# Patient Record
Sex: MALE | Race: BLACK OR AFRICAN AMERICAN
[De-identification: names, ages, dates, MRNs, and addresses within clinical notes are randomized per-mention and may not be internally consistent; named-entity substitution may affect disease eponyms.]

---

## 2018-04-04 ENCOUNTER — HOSPITAL ENCOUNTER (INPATIENT)
Dept: HOSPITAL 35 - ER | Age: 58
LOS: 5 days | Discharge: NURSING, REHAB OR LONG-TERM CARE | DRG: 65 | End: 2018-04-09
Attending: HOSPITALIST | Admitting: HOSPITALIST
Payer: COMMERCIAL

## 2018-04-04 VITALS — DIASTOLIC BLOOD PRESSURE: 90 MMHG | SYSTOLIC BLOOD PRESSURE: 140 MMHG

## 2018-04-04 VITALS — BODY MASS INDEX: 31.7 KG/M2 | HEIGHT: 69.02 IN | WEIGHT: 214 LBS

## 2018-04-04 DIAGNOSIS — I44.7: ICD-10-CM

## 2018-04-04 DIAGNOSIS — N18.9: ICD-10-CM

## 2018-04-04 DIAGNOSIS — G81.91: ICD-10-CM

## 2018-04-04 DIAGNOSIS — E11.22: ICD-10-CM

## 2018-04-04 DIAGNOSIS — N17.9: ICD-10-CM

## 2018-04-04 DIAGNOSIS — Z23: ICD-10-CM

## 2018-04-04 DIAGNOSIS — Z79.899: ICD-10-CM

## 2018-04-04 DIAGNOSIS — Z82.49: ICD-10-CM

## 2018-04-04 DIAGNOSIS — Z82.3: ICD-10-CM

## 2018-04-04 DIAGNOSIS — I63.9: Primary | ICD-10-CM

## 2018-04-04 DIAGNOSIS — E78.00: ICD-10-CM

## 2018-04-04 DIAGNOSIS — G51.0: ICD-10-CM

## 2018-04-04 DIAGNOSIS — E78.5: ICD-10-CM

## 2018-04-04 DIAGNOSIS — I42.9: ICD-10-CM

## 2018-04-04 DIAGNOSIS — Z79.84: ICD-10-CM

## 2018-04-04 DIAGNOSIS — I12.9: ICD-10-CM

## 2018-04-04 DIAGNOSIS — F17.210: ICD-10-CM

## 2018-04-04 LAB
ANION GAP SERPL CALC-SCNC: 11 MMOL/L (ref 7–16)
BASOPHILS NFR BLD AUTO: 0.9 % (ref 0–2)
BUN SERPL-MCNC: 54 MG/DL (ref 7–18)
CALCIUM SERPL-MCNC: 9 MG/DL (ref 8.5–10.1)
CHLORIDE SERPL-SCNC: 100 MMOL/L (ref 98–107)
CO2 SERPL-SCNC: 21 MMOL/L (ref 21–32)
CREAT SERPL-MCNC: 2.5 MG/DL (ref 0.7–1.3)
EOSINOPHIL NFR BLD: 3.4 % (ref 0–3)
ERYTHROCYTE [DISTWIDTH] IN BLOOD BY AUTOMATED COUNT: 13.2 % (ref 10.5–14.5)
GLUCOSE SERPL-MCNC: 137 MG/DL (ref 74–106)
GRANULOCYTES NFR BLD MANUAL: 52.7 % (ref 36–66)
HCT VFR BLD CALC: 41.4 % (ref 42–52)
HGB BLD-MCNC: 14.1 GM/DL (ref 14–18)
LYMPHOCYTES NFR BLD AUTO: 33.4 % (ref 24–44)
MAGNESIUM SERPL-MCNC: 2.3 MG/DL (ref 1.8–2.4)
MCH RBC QN AUTO: 31.1 PG (ref 26–34)
MCHC RBC AUTO-ENTMCNC: 34.1 G/DL (ref 28–37)
MCV RBC: 91.1 FL (ref 80–100)
MONOCYTES NFR BLD: 9.6 % (ref 1–8)
NEUTROPHILS # BLD: 4.9 THOU/UL (ref 1.4–8.2)
PLATELET # BLD: 399 THOU/UL (ref 150–400)
POTASSIUM SERPL-SCNC: 4.3 MMOL/L (ref 3.5–5.1)
RBC # BLD AUTO: 4.55 MIL/UL (ref 4.5–6)
SODIUM SERPL-SCNC: 132 MMOL/L (ref 136–145)
WBC # BLD AUTO: 9.2 THOU/UL (ref 4–11)

## 2018-04-04 PROCEDURE — 10879: CPT

## 2018-04-05 VITALS — SYSTOLIC BLOOD PRESSURE: 136 MMHG | DIASTOLIC BLOOD PRESSURE: 85 MMHG

## 2018-04-05 VITALS — SYSTOLIC BLOOD PRESSURE: 101 MMHG | DIASTOLIC BLOOD PRESSURE: 68 MMHG

## 2018-04-05 VITALS — SYSTOLIC BLOOD PRESSURE: 111 MMHG | DIASTOLIC BLOOD PRESSURE: 73 MMHG

## 2018-04-05 VITALS — SYSTOLIC BLOOD PRESSURE: 147 MMHG | DIASTOLIC BLOOD PRESSURE: 86 MMHG

## 2018-04-05 VITALS — DIASTOLIC BLOOD PRESSURE: 63 MMHG | SYSTOLIC BLOOD PRESSURE: 114 MMHG

## 2018-04-05 VITALS — SYSTOLIC BLOOD PRESSURE: 147 MMHG | DIASTOLIC BLOOD PRESSURE: 91 MMHG

## 2018-04-05 VITALS — SYSTOLIC BLOOD PRESSURE: 124 MMHG | DIASTOLIC BLOOD PRESSURE: 71 MMHG

## 2018-04-05 VITALS — DIASTOLIC BLOOD PRESSURE: 81 MMHG | SYSTOLIC BLOOD PRESSURE: 134 MMHG

## 2018-04-05 LAB
ALBUMIN SERPL-MCNC: 3.7 G/DL (ref 3.4–5)
ALT SERPL-CCNC: 45 U/L (ref 30–65)
AST SERPL-CCNC: 20 U/L (ref 15–37)
BILIRUB DIRECT SERPL-MCNC: 0.1 MG/DL
BILIRUB SERPL-MCNC: 0.4 MG/DL
EST. AVERAGE GLUCOSE BLD GHB EST-MCNC: 143 MG/DL
GLYCOHEMOGLOBIN (HGB A1C): 6.6 % (ref 4.8–5.6)
PROT SERPL-MCNC: 7.5 G/DL (ref 6.4–8.2)

## 2018-04-06 VITALS — SYSTOLIC BLOOD PRESSURE: 117 MMHG | DIASTOLIC BLOOD PRESSURE: 70 MMHG

## 2018-04-06 VITALS — SYSTOLIC BLOOD PRESSURE: 139 MMHG | DIASTOLIC BLOOD PRESSURE: 88 MMHG

## 2018-04-06 VITALS — SYSTOLIC BLOOD PRESSURE: 141 MMHG | DIASTOLIC BLOOD PRESSURE: 81 MMHG

## 2018-04-06 VITALS — DIASTOLIC BLOOD PRESSURE: 96 MMHG | SYSTOLIC BLOOD PRESSURE: 141 MMHG

## 2018-04-06 VITALS — DIASTOLIC BLOOD PRESSURE: 104 MMHG | SYSTOLIC BLOOD PRESSURE: 164 MMHG

## 2018-04-06 LAB
ALBUMIN SERPL-MCNC: 3.4 G/DL (ref 3.4–5)
ALT SERPL-CCNC: 39 U/L (ref 30–65)
ANION GAP SERPL CALC-SCNC: 8 MMOL/L (ref 7–16)
AST SERPL-CCNC: 19 U/L (ref 15–37)
BILIRUB SERPL-MCNC: 0.6 MG/DL
BILIRUB UR-MCNC: NEGATIVE MG/DL
BUN SERPL-MCNC: 34 MG/DL (ref 7–18)
CALCIUM SERPL-MCNC: 8.9 MG/DL (ref 8.5–10.1)
CHLORIDE SERPL-SCNC: 102 MMOL/L (ref 98–107)
CHOLEST SERPL-MCNC: 167 MG/DL (ref ?–200)
CO2 SERPL-SCNC: 23 MMOL/L (ref 21–32)
COLOR UR: YELLOW
CREAT SERPL-MCNC: 1.8 MG/DL (ref 0.7–1.3)
GLUCOSE SERPL-MCNC: 102 MG/DL (ref 74–106)
HDLC SERPL-MCNC: 41 MG/DL (ref 40–?)
HYALINE CASTS #/AREA URNS LPF: (no result) /LPF
KETONES UR STRIP-MCNC: NEGATIVE MG/DL
LDLC SERPL-MCNC: 95 MG/DL (ref ?–100)
MAGNESIUM SERPL-MCNC: 1.9 MG/DL (ref 1.8–2.4)
POTASSIUM SERPL-SCNC: 4.9 MMOL/L (ref 3.5–5.1)
PROT SERPL-MCNC: 7.2 G/DL (ref 6.4–8.2)
RBC # UR STRIP: (no result) /UL
RBC #/AREA URNS HPF: (no result) /HPF (ref 0–2)
SODIUM SERPL-SCNC: 133 MMOL/L (ref 136–145)
SP GR UR STRIP: 1.02 (ref 1–1.03)
SQUAMOUS: (no result) /LPF (ref 0–3)
TC:HDL: 4.1 RATIO
TRIGL SERPL-MCNC: 157 MG/DL (ref ?–150)
TROPONIN I SERPL-MCNC: < 0.04 NG/ML (ref ?–0.06)
URINE CLARITY: CLEAR
URINE CREATININE-RANDOM*: 91.5 MG/DL
URINE GLUCOSE-RANDOM*: NEGATIVE
URINE LEUKOCYTES-REFLEX: NEGATIVE
URINE NITRITE-REFLEX: NEGATIVE
URINE PROTEIN (DIPSTICK): NEGATIVE
URINE PROTEIN-RANDOM*: 9.8 MG/DL (ref ?–11.9)
URINE SODIUM-RANDOM*: 132 MMOL/L
URINE WBC-REFLEX: (no result) /HPF (ref 0–5)
UROBILINOGEN UR STRIP-ACNC: 0.2 E.U./DL (ref 0.2–1)
VLDLC SERPL CALC-MCNC: 31 MG/DL (ref ?–40)

## 2018-04-07 VITALS — DIASTOLIC BLOOD PRESSURE: 81 MMHG | SYSTOLIC BLOOD PRESSURE: 128 MMHG

## 2018-04-07 VITALS — DIASTOLIC BLOOD PRESSURE: 94 MMHG | SYSTOLIC BLOOD PRESSURE: 136 MMHG

## 2018-04-07 VITALS — DIASTOLIC BLOOD PRESSURE: 89 MMHG | SYSTOLIC BLOOD PRESSURE: 149 MMHG

## 2018-04-07 VITALS — DIASTOLIC BLOOD PRESSURE: 97 MMHG | SYSTOLIC BLOOD PRESSURE: 136 MMHG

## 2018-04-07 VITALS — SYSTOLIC BLOOD PRESSURE: 129 MMHG | DIASTOLIC BLOOD PRESSURE: 85 MMHG

## 2018-04-08 VITALS — SYSTOLIC BLOOD PRESSURE: 116 MMHG | DIASTOLIC BLOOD PRESSURE: 70 MMHG

## 2018-04-08 VITALS — DIASTOLIC BLOOD PRESSURE: 82 MMHG | SYSTOLIC BLOOD PRESSURE: 133 MMHG

## 2018-04-08 VITALS — DIASTOLIC BLOOD PRESSURE: 92 MMHG | SYSTOLIC BLOOD PRESSURE: 132 MMHG

## 2018-04-08 VITALS — DIASTOLIC BLOOD PRESSURE: 85 MMHG | SYSTOLIC BLOOD PRESSURE: 123 MMHG

## 2018-04-08 VITALS — SYSTOLIC BLOOD PRESSURE: 130 MMHG | DIASTOLIC BLOOD PRESSURE: 83 MMHG

## 2018-04-08 LAB
ALBUMIN SERPL-MCNC: 3.1 G/DL (ref 3.4–5)
ANION GAP SERPL CALC-SCNC: 10 MMOL/L (ref 7–16)
BUN SERPL-MCNC: 22 MG/DL (ref 7–18)
CALCIUM SERPL-MCNC: 9.1 MG/DL (ref 8.5–10.1)
CHLORIDE SERPL-SCNC: 101 MMOL/L (ref 98–107)
CO2 SERPL-SCNC: 23 MMOL/L (ref 21–32)
CREAT SERPL-MCNC: 1.6 MG/DL (ref 0.7–1.3)
GLUCOSE SERPL-MCNC: 100 MG/DL (ref 74–106)
PHOSPHATE SERPL-MCNC: 3.2 MG/DL (ref 2.5–4.9)
POTASSIUM SERPL-SCNC: 4.4 MMOL/L (ref 3.5–5.1)
SODIUM SERPL-SCNC: 134 MMOL/L (ref 136–145)

## 2018-04-09 ENCOUNTER — HOSPITAL ENCOUNTER (INPATIENT)
Dept: HOSPITAL 35 - REHABU | Age: 58
LOS: 10 days | Discharge: HOME HEALTH | DRG: 56 | End: 2018-04-19
Attending: PHYSICAL MEDICINE & REHABILITATION | Admitting: PHYSICAL MEDICINE & REHABILITATION
Payer: COMMERCIAL

## 2018-04-09 VITALS — DIASTOLIC BLOOD PRESSURE: 89 MMHG | SYSTOLIC BLOOD PRESSURE: 140 MMHG

## 2018-04-09 VITALS — BODY MASS INDEX: 31.53 KG/M2 | WEIGHT: 212.9 LBS | HEIGHT: 69.02 IN

## 2018-04-09 VITALS — DIASTOLIC BLOOD PRESSURE: 90 MMHG | SYSTOLIC BLOOD PRESSURE: 146 MMHG

## 2018-04-09 VITALS — SYSTOLIC BLOOD PRESSURE: 147 MMHG | DIASTOLIC BLOOD PRESSURE: 87 MMHG

## 2018-04-09 VITALS — SYSTOLIC BLOOD PRESSURE: 138 MMHG | DIASTOLIC BLOOD PRESSURE: 79 MMHG

## 2018-04-09 VITALS — SYSTOLIC BLOOD PRESSURE: 139 MMHG | DIASTOLIC BLOOD PRESSURE: 93 MMHG

## 2018-04-09 DIAGNOSIS — R47.1: ICD-10-CM

## 2018-04-09 DIAGNOSIS — I63.9: ICD-10-CM

## 2018-04-09 DIAGNOSIS — G51.0: ICD-10-CM

## 2018-04-09 DIAGNOSIS — Z60.2: ICD-10-CM

## 2018-04-09 DIAGNOSIS — G81.91: Primary | ICD-10-CM

## 2018-04-09 DIAGNOSIS — Z80.9: ICD-10-CM

## 2018-04-09 DIAGNOSIS — R29.810: ICD-10-CM

## 2018-04-09 DIAGNOSIS — N18.9: ICD-10-CM

## 2018-04-09 DIAGNOSIS — I42.9: ICD-10-CM

## 2018-04-09 DIAGNOSIS — E78.5: ICD-10-CM

## 2018-04-09 DIAGNOSIS — R47.81: ICD-10-CM

## 2018-04-09 DIAGNOSIS — I12.9: ICD-10-CM

## 2018-04-09 DIAGNOSIS — E78.00: ICD-10-CM

## 2018-04-09 DIAGNOSIS — Z82.49: ICD-10-CM

## 2018-04-09 DIAGNOSIS — E11.22: ICD-10-CM

## 2018-04-09 DIAGNOSIS — N17.9: ICD-10-CM

## 2018-04-09 DIAGNOSIS — I44.7: ICD-10-CM

## 2018-04-09 DIAGNOSIS — Z82.3: ICD-10-CM

## 2018-04-09 DIAGNOSIS — F17.210: ICD-10-CM

## 2018-04-09 LAB
ALBUMIN SERPL-MCNC: 3 G/DL (ref 3.4–5)
ANION GAP SERPL CALC-SCNC: 8 MMOL/L (ref 7–16)
BASOPHILS NFR BLD AUTO: 1.3 % (ref 0–2)
BUN SERPL-MCNC: 20 MG/DL (ref 7–18)
CALCIUM SERPL-MCNC: 8.9 MG/DL (ref 8.5–10.1)
CHLORIDE SERPL-SCNC: 101 MMOL/L (ref 98–107)
CO2 SERPL-SCNC: 24 MMOL/L (ref 21–32)
CREAT SERPL-MCNC: 1.6 MG/DL (ref 0.7–1.3)
EOSINOPHIL NFR BLD: 4.8 % (ref 0–3)
ERYTHROCYTE [DISTWIDTH] IN BLOOD BY AUTOMATED COUNT: 12.6 % (ref 10.5–14.5)
GLOBULIN TOTAL: 3.9 G/DL (ref 2.2–3.9)
GLUCOSE SERPL-MCNC: 102 MG/DL (ref 74–106)
GRANULOCYTES NFR BLD MANUAL: 53.8 % (ref 36–66)
HCT VFR BLD CALC: 34.9 % (ref 42–52)
HGB BLD-MCNC: 12.3 GM/DL (ref 14–18)
KAPPA LC SERPL-MCNC: 33.1 MG/L (ref 3.3–19.4)
KAPPA LC/LAMBDA SER: 1.72 {RATIO} (ref 0.26–1.65)
LAMBDA LC FREE SERPL-MCNC: 19.3 MG/L (ref 5.7–26.3)
LYMPHOCYTES NFR BLD AUTO: 30.4 % (ref 24–44)
MCH RBC QN AUTO: 31.9 PG (ref 26–34)
MCHC RBC AUTO-ENTMCNC: 35.3 G/DL (ref 28–37)
MCV RBC: 90.6 FL (ref 80–100)
MONOCYTES NFR BLD: 9.7 % (ref 1–8)
NEUTROPHILS # BLD: 4.6 THOU/UL (ref 1.4–8.2)
PHOSPHATE SERPL-MCNC: 3.8 MG/DL (ref 2.5–4.9)
PLATELET # BLD: 347 THOU/UL (ref 150–400)
POTASSIUM SERPL-SCNC: 4.4 MMOL/L (ref 3.5–5.1)
RBC # BLD AUTO: 3.86 MIL/UL (ref 4.5–6)
SODIUM SERPL-SCNC: 133 MMOL/L (ref 136–145)
WBC # BLD AUTO: 8.6 THOU/UL (ref 4–11)

## 2018-04-09 PROCEDURE — 10112: CPT

## 2018-04-09 SDOH — SOCIAL STABILITY - SOCIAL INSECURITY: PROBLEMS RELATED TO LIVING ALONE: Z60.2

## 2018-04-10 VITALS — SYSTOLIC BLOOD PRESSURE: 130 MMHG | DIASTOLIC BLOOD PRESSURE: 92 MMHG

## 2018-04-10 VITALS — DIASTOLIC BLOOD PRESSURE: 77 MMHG | SYSTOLIC BLOOD PRESSURE: 149 MMHG

## 2018-04-10 LAB
ANION GAP SERPL CALC-SCNC: 8 MMOL/L (ref 7–16)
BUN SERPL-MCNC: 21 MG/DL (ref 7–18)
CALCIUM SERPL-MCNC: 8.7 MG/DL (ref 8.5–10.1)
CHLORIDE SERPL-SCNC: 101 MMOL/L (ref 98–107)
CO2 SERPL-SCNC: 25 MMOL/L (ref 21–32)
CREAT SERPL-MCNC: 1.6 MG/DL (ref 0.7–1.3)
ERYTHROCYTE [DISTWIDTH] IN BLOOD BY AUTOMATED COUNT: 13.3 % (ref 10.5–14.5)
GLUCOSE SERPL-MCNC: 99 MG/DL (ref 74–106)
HCT VFR BLD CALC: 36.1 % (ref 42–52)
HGB BLD-MCNC: 12.4 GM/DL (ref 14–18)
MCH RBC QN AUTO: 31.4 PG (ref 26–34)
MCHC RBC AUTO-ENTMCNC: 34.3 G/DL (ref 28–37)
MCV RBC: 91.5 FL (ref 80–100)
PLATELET # BLD: 368 THOU/UL (ref 150–400)
POTASSIUM SERPL-SCNC: 4.4 MMOL/L (ref 3.5–5.1)
RBC # BLD AUTO: 3.94 MIL/UL (ref 4.5–6)
SODIUM SERPL-SCNC: 134 MMOL/L (ref 136–145)
WBC # BLD AUTO: 7.7 THOU/UL (ref 4–11)

## 2018-04-11 VITALS — DIASTOLIC BLOOD PRESSURE: 79 MMHG | SYSTOLIC BLOOD PRESSURE: 117 MMHG

## 2018-04-11 VITALS — SYSTOLIC BLOOD PRESSURE: 135 MMHG | DIASTOLIC BLOOD PRESSURE: 75 MMHG

## 2018-04-12 VITALS — DIASTOLIC BLOOD PRESSURE: 91 MMHG | SYSTOLIC BLOOD PRESSURE: 147 MMHG

## 2018-04-12 VITALS — SYSTOLIC BLOOD PRESSURE: 141 MMHG | DIASTOLIC BLOOD PRESSURE: 82 MMHG

## 2018-04-13 VITALS — SYSTOLIC BLOOD PRESSURE: 142 MMHG | DIASTOLIC BLOOD PRESSURE: 82 MMHG

## 2018-04-13 VITALS — DIASTOLIC BLOOD PRESSURE: 77 MMHG | SYSTOLIC BLOOD PRESSURE: 133 MMHG

## 2018-04-13 LAB
ALBUMIN SERPL-MCNC: 2.9 G/DL (ref 3.4–5)
ANION GAP SERPL CALC-SCNC: 10 MMOL/L (ref 7–16)
BUN SERPL-MCNC: 21 MG/DL (ref 7–18)
CALCIUM SERPL-MCNC: 8.8 MG/DL (ref 8.5–10.1)
CHLORIDE SERPL-SCNC: 102 MMOL/L (ref 98–107)
CO2 SERPL-SCNC: 23 MMOL/L (ref 21–32)
CREAT SERPL-MCNC: 1.7 MG/DL (ref 0.7–1.3)
GLUCOSE SERPL-MCNC: 95 MG/DL (ref 74–106)
PHOSPHATE SERPL-MCNC: 3.3 MG/DL (ref 2.5–4.9)
POTASSIUM SERPL-SCNC: 4.4 MMOL/L (ref 3.5–5.1)
SODIUM SERPL-SCNC: 135 MMOL/L (ref 136–145)

## 2018-04-14 VITALS — DIASTOLIC BLOOD PRESSURE: 82 MMHG | SYSTOLIC BLOOD PRESSURE: 130 MMHG

## 2018-04-14 VITALS — DIASTOLIC BLOOD PRESSURE: 77 MMHG | SYSTOLIC BLOOD PRESSURE: 136 MMHG

## 2018-04-15 VITALS — SYSTOLIC BLOOD PRESSURE: 167 MMHG | DIASTOLIC BLOOD PRESSURE: 100 MMHG

## 2018-04-15 VITALS — DIASTOLIC BLOOD PRESSURE: 99 MMHG | SYSTOLIC BLOOD PRESSURE: 168 MMHG

## 2018-04-15 VITALS — DIASTOLIC BLOOD PRESSURE: 85 MMHG | SYSTOLIC BLOOD PRESSURE: 152 MMHG

## 2018-04-16 VITALS — DIASTOLIC BLOOD PRESSURE: 80 MMHG | SYSTOLIC BLOOD PRESSURE: 147 MMHG

## 2018-04-16 VITALS — SYSTOLIC BLOOD PRESSURE: 148 MMHG | DIASTOLIC BLOOD PRESSURE: 91 MMHG

## 2018-04-17 VITALS — SYSTOLIC BLOOD PRESSURE: 140 MMHG | DIASTOLIC BLOOD PRESSURE: 85 MMHG

## 2018-04-17 VITALS — DIASTOLIC BLOOD PRESSURE: 75 MMHG | SYSTOLIC BLOOD PRESSURE: 129 MMHG

## 2018-04-17 LAB
ALBUMIN SERPL-MCNC: 3 G/DL (ref 3.4–5)
ANION GAP SERPL CALC-SCNC: 10 MMOL/L (ref 7–16)
BUN SERPL-MCNC: 22 MG/DL (ref 7–18)
CALCIUM SERPL-MCNC: 8.8 MG/DL (ref 8.5–10.1)
CHLORIDE SERPL-SCNC: 102 MMOL/L (ref 98–107)
CO2 SERPL-SCNC: 24 MMOL/L (ref 21–32)
CREAT SERPL-MCNC: 1.6 MG/DL (ref 0.7–1.3)
GLUCOSE SERPL-MCNC: 96 MG/DL (ref 74–106)
PHOSPHATE SERPL-MCNC: 3.6 MG/DL (ref 2.5–4.9)
POTASSIUM SERPL-SCNC: 4.1 MMOL/L (ref 3.5–5.1)
SODIUM SERPL-SCNC: 136 MMOL/L (ref 136–145)

## 2018-04-18 VITALS — SYSTOLIC BLOOD PRESSURE: 144 MMHG | DIASTOLIC BLOOD PRESSURE: 87 MMHG

## 2018-04-18 VITALS — DIASTOLIC BLOOD PRESSURE: 73 MMHG | SYSTOLIC BLOOD PRESSURE: 138 MMHG

## 2018-04-18 VITALS — SYSTOLIC BLOOD PRESSURE: 128 MMHG | DIASTOLIC BLOOD PRESSURE: 70 MMHG

## 2018-04-19 VITALS — DIASTOLIC BLOOD PRESSURE: 68 MMHG | SYSTOLIC BLOOD PRESSURE: 125 MMHG

## 2018-04-19 VITALS — SYSTOLIC BLOOD PRESSURE: 144 MMHG | DIASTOLIC BLOOD PRESSURE: 87 MMHG

## 2018-04-26 ENCOUNTER — HOSPITAL ENCOUNTER (OUTPATIENT)
Dept: HOSPITAL 35 - SEN | Age: 58
End: 2018-04-26
Attending: FAMILY MEDICINE
Payer: COMMERCIAL

## 2018-04-26 VITALS — SYSTOLIC BLOOD PRESSURE: 169 MMHG | DIASTOLIC BLOOD PRESSURE: 94 MMHG

## 2018-04-26 VITALS — WEIGHT: 214 LBS | HEIGHT: 69.02 IN | BODY MASS INDEX: 31.7 KG/M2

## 2018-04-26 DIAGNOSIS — E11.8: ICD-10-CM

## 2018-04-26 DIAGNOSIS — I63.9: ICD-10-CM

## 2018-04-26 DIAGNOSIS — I10: Primary | ICD-10-CM

## 2018-04-26 DIAGNOSIS — E78.5: ICD-10-CM

## 2018-04-26 LAB
ALBUMIN SERPL-MCNC: 3.7 G/DL (ref 3.4–5)
ALT SERPL-CCNC: 47 U/L (ref 30–65)
ANION GAP SERPL CALC-SCNC: 8 MMOL/L (ref 7–16)
AST SERPL-CCNC: 19 U/L (ref 15–37)
BASOPHILS NFR BLD AUTO: 1.2 % (ref 0–2)
BILIRUB SERPL-MCNC: 0.4 MG/DL
BUN SERPL-MCNC: 17 MG/DL (ref 7–18)
CALCIUM SERPL-MCNC: 9.7 MG/DL (ref 8.5–10.1)
CHLORIDE SERPL-SCNC: 103 MMOL/L (ref 98–107)
CHOLEST SERPL-MCNC: 154 MG/DL (ref ?–200)
CO2 SERPL-SCNC: 28 MMOL/L (ref 21–32)
CREAT SERPL-MCNC: 1.7 MG/DL (ref 0.7–1.3)
EOSINOPHIL NFR BLD: 6.8 % (ref 0–3)
ERYTHROCYTE [DISTWIDTH] IN BLOOD BY AUTOMATED COUNT: 13.2 % (ref 10.5–14.5)
GLUCOSE SERPL-MCNC: 92 MG/DL (ref 74–106)
GRANULOCYTES NFR BLD MANUAL: 46.3 % (ref 36–66)
HCT VFR BLD CALC: 38.3 % (ref 42–52)
HDLC SERPL-MCNC: 41 MG/DL (ref 40–?)
HGB BLD-MCNC: 13 GM/DL (ref 14–18)
LDLC SERPL-MCNC: 84 MG/DL (ref ?–100)
LYMPHOCYTES NFR BLD AUTO: 37.7 % (ref 24–44)
MCH RBC QN AUTO: 30.4 PG (ref 26–34)
MCHC RBC AUTO-ENTMCNC: 33.8 G/DL (ref 28–37)
MCV RBC: 89.9 FL (ref 80–100)
MONOCYTES NFR BLD: 8 % (ref 1–8)
NEUTROPHILS # BLD: 3.4 THOU/UL (ref 1.4–8.2)
PLATELET # BLD: 476 THOU/UL (ref 150–400)
POTASSIUM SERPL-SCNC: 4.4 MMOL/L (ref 3.5–5.1)
PROT SERPL-MCNC: 8.1 G/DL (ref 6.4–8.2)
RBC # BLD AUTO: 4.26 MIL/UL (ref 4.5–6)
SODIUM SERPL-SCNC: 139 MMOL/L (ref 136–145)
TC:HDL: 3.8 RATIO
TRIGL SERPL-MCNC: 146 MG/DL (ref ?–150)
VLDLC SERPL CALC-MCNC: 29 MG/DL (ref ?–40)
WBC # BLD AUTO: 7.3 THOU/UL (ref 4–11)

## 2018-04-27 LAB
EST. AVERAGE GLUCOSE BLD GHB EST-MCNC: 140 MG/DL
GLYCOHEMOGLOBIN (HGB A1C): 6.5 % (ref 4.8–5.6)

## 2018-05-24 ENCOUNTER — HOSPITAL ENCOUNTER (OUTPATIENT)
Dept: HOSPITAL 35 - SEN | Age: 58
End: 2018-05-24
Attending: FAMILY MEDICINE
Payer: COMMERCIAL

## 2018-05-24 VITALS — SYSTOLIC BLOOD PRESSURE: 134 MMHG | DIASTOLIC BLOOD PRESSURE: 76 MMHG

## 2018-05-24 VITALS — HEIGHT: 67.99 IN | BODY MASS INDEX: 31.98 KG/M2 | WEIGHT: 211 LBS

## 2018-05-24 DIAGNOSIS — I10: ICD-10-CM

## 2018-05-24 DIAGNOSIS — M62.838: ICD-10-CM

## 2018-05-24 DIAGNOSIS — E11.9: ICD-10-CM

## 2018-05-24 DIAGNOSIS — M79.604: Primary | ICD-10-CM

## 2018-06-21 ENCOUNTER — HOSPITAL ENCOUNTER (OUTPATIENT)
Dept: HOSPITAL 35 - SEN | Age: 58
End: 2018-06-21
Attending: FAMILY MEDICINE
Payer: COMMERCIAL

## 2018-06-21 VITALS — DIASTOLIC BLOOD PRESSURE: 75 MMHG | SYSTOLIC BLOOD PRESSURE: 126 MMHG

## 2018-06-21 VITALS — HEIGHT: 67.99 IN | WEIGHT: 210 LBS | BODY MASS INDEX: 31.83 KG/M2

## 2018-06-21 DIAGNOSIS — I10: ICD-10-CM

## 2018-06-21 DIAGNOSIS — E78.00: ICD-10-CM

## 2018-06-21 DIAGNOSIS — Z87.891: ICD-10-CM

## 2018-06-21 DIAGNOSIS — Z09: Primary | ICD-10-CM

## 2018-06-21 DIAGNOSIS — E11.9: ICD-10-CM

## 2018-07-05 ENCOUNTER — HOSPITAL ENCOUNTER (OUTPATIENT)
Dept: HOSPITAL 35 - SEN | Age: 58
End: 2018-07-05
Attending: FAMILY MEDICINE
Payer: COMMERCIAL

## 2018-07-05 VITALS — DIASTOLIC BLOOD PRESSURE: 80 MMHG | SYSTOLIC BLOOD PRESSURE: 122 MMHG

## 2018-07-05 DIAGNOSIS — I10: ICD-10-CM

## 2018-07-05 DIAGNOSIS — F10.99: ICD-10-CM

## 2018-07-05 DIAGNOSIS — E11.9: ICD-10-CM

## 2018-07-05 DIAGNOSIS — Z09: Primary | ICD-10-CM

## 2018-07-05 DIAGNOSIS — F17.200: ICD-10-CM

## 2018-08-09 ENCOUNTER — HOSPITAL ENCOUNTER (OUTPATIENT)
Dept: HOSPITAL 35 - SEN | Age: 58
End: 2018-08-09
Attending: FAMILY MEDICINE
Payer: COMMERCIAL

## 2018-08-09 VITALS — HEIGHT: 65.98 IN | WEIGHT: 219 LBS | BODY MASS INDEX: 35.2 KG/M2

## 2018-08-09 VITALS — DIASTOLIC BLOOD PRESSURE: 90 MMHG | SYSTOLIC BLOOD PRESSURE: 159 MMHG

## 2018-08-09 DIAGNOSIS — E11.9: ICD-10-CM

## 2018-08-09 DIAGNOSIS — I44.7: ICD-10-CM

## 2018-08-09 DIAGNOSIS — E78.00: ICD-10-CM

## 2018-08-09 DIAGNOSIS — I10: ICD-10-CM

## 2018-08-09 DIAGNOSIS — Z09: Primary | ICD-10-CM

## 2018-08-09 DIAGNOSIS — Z87.891: ICD-10-CM

## 2018-08-09 DIAGNOSIS — F10.99: ICD-10-CM

## 2018-08-10 LAB
EST. AVERAGE GLUCOSE BLD GHB EST-MCNC: 105 MG/DL
GLYCOHEMOGLOBIN (HGB A1C): 5.3 % (ref 4.8–5.6)

## 2018-10-11 ENCOUNTER — HOSPITAL ENCOUNTER (OUTPATIENT)
Dept: HOSPITAL 35 - SEN | Age: 58
End: 2018-10-11
Attending: FAMILY MEDICINE
Payer: COMMERCIAL

## 2018-10-11 VITALS — HEIGHT: 65.98 IN | WEIGHT: 229.2 LBS | BODY MASS INDEX: 36.83 KG/M2

## 2018-10-11 VITALS — SYSTOLIC BLOOD PRESSURE: 159 MMHG | DIASTOLIC BLOOD PRESSURE: 92 MMHG

## 2018-10-11 DIAGNOSIS — E78.5: ICD-10-CM

## 2018-10-11 DIAGNOSIS — Z09: Primary | ICD-10-CM

## 2018-10-11 DIAGNOSIS — E11.9: ICD-10-CM

## 2018-10-11 DIAGNOSIS — I10: ICD-10-CM

## 2018-10-11 DIAGNOSIS — Z87.891: ICD-10-CM

## 2021-07-23 ENCOUNTER — HOSPITAL ENCOUNTER (OUTPATIENT)
Dept: HOSPITAL 35 - SJCVC | Age: 61
End: 2021-07-23
Attending: INTERNAL MEDICINE
Payer: COMMERCIAL

## 2021-07-23 DIAGNOSIS — R06.00: ICD-10-CM

## 2021-07-23 DIAGNOSIS — I11.9: ICD-10-CM

## 2021-07-23 DIAGNOSIS — E11.9: ICD-10-CM

## 2021-07-23 DIAGNOSIS — Z79.899: ICD-10-CM

## 2021-07-23 DIAGNOSIS — Z88.1: ICD-10-CM

## 2021-07-23 DIAGNOSIS — I42.9: ICD-10-CM

## 2021-07-23 DIAGNOSIS — R94.31: Primary | ICD-10-CM

## 2021-07-23 DIAGNOSIS — F17.200: ICD-10-CM

## 2021-07-23 DIAGNOSIS — Z86.73: ICD-10-CM

## 2021-07-23 DIAGNOSIS — Z79.82: ICD-10-CM

## 2021-09-25 ENCOUNTER — HOSPITAL ENCOUNTER (INPATIENT)
Dept: HOSPITAL 35 - ER | Age: 61
LOS: 8 days | Discharge: HOME | DRG: 64 | End: 2021-10-03
Attending: HOSPITALIST | Admitting: HOSPITALIST
Payer: COMMERCIAL

## 2021-09-25 VITALS — DIASTOLIC BLOOD PRESSURE: 104 MMHG | SYSTOLIC BLOOD PRESSURE: 167 MMHG

## 2021-09-25 VITALS — WEIGHT: 210 LBS | HEIGHT: 69.02 IN | BODY MASS INDEX: 31.1 KG/M2

## 2021-09-25 DIAGNOSIS — I77.74: ICD-10-CM

## 2021-09-25 DIAGNOSIS — N18.30: ICD-10-CM

## 2021-09-25 DIAGNOSIS — I12.9: ICD-10-CM

## 2021-09-25 DIAGNOSIS — G93.40: ICD-10-CM

## 2021-09-25 DIAGNOSIS — E78.00: ICD-10-CM

## 2021-09-25 DIAGNOSIS — R41.0: ICD-10-CM

## 2021-09-25 DIAGNOSIS — E78.5: ICD-10-CM

## 2021-09-25 DIAGNOSIS — Z20.822: ICD-10-CM

## 2021-09-25 DIAGNOSIS — Z91.14: ICD-10-CM

## 2021-09-25 DIAGNOSIS — Z87.891: ICD-10-CM

## 2021-09-25 DIAGNOSIS — I63.89: Primary | ICD-10-CM

## 2021-09-25 DIAGNOSIS — E11.22: ICD-10-CM

## 2021-09-25 DIAGNOSIS — I62.9: ICD-10-CM

## 2021-09-25 DIAGNOSIS — N17.9: ICD-10-CM

## 2021-09-25 DIAGNOSIS — Z28.21: ICD-10-CM

## 2021-09-25 DIAGNOSIS — E87.1: ICD-10-CM

## 2021-09-25 LAB
ANION GAP SERPL CALC-SCNC: 9 MMOL/L (ref 7–16)
APTT BLD: 28.6 SECONDS (ref 24.5–32.8)
BASOPHILS NFR BLD AUTO: 1.3 % (ref 0–2)
BUN SERPL-MCNC: 26 MG/DL (ref 7–18)
CALCIUM SERPL-MCNC: 8.9 MG/DL (ref 8.5–10.1)
CHLORIDE SERPL-SCNC: 100 MMOL/L (ref 98–107)
CO2 SERPL-SCNC: 23 MMOL/L (ref 21–32)
CREAT SERPL-MCNC: 1.8 MG/DL (ref 0.7–1.3)
EOSINOPHIL NFR BLD: 4 % (ref 0–3)
ERYTHROCYTE [DISTWIDTH] IN BLOOD BY AUTOMATED COUNT: 13.4 % (ref 10.5–14.5)
GLUCOSE SERPL-MCNC: 294 MG/DL (ref 74–106)
GRANULOCYTES NFR BLD MANUAL: 51.5 % (ref 36–66)
HCT VFR BLD CALC: 43.5 % (ref 42–52)
HGB BLD-MCNC: 15.3 GM/DL (ref 14–18)
INR PPP: 0.94
LYMPHOCYTES NFR BLD AUTO: 34.2 % (ref 24–44)
MCH RBC QN AUTO: 31.9 PG (ref 26–34)
MCHC RBC AUTO-ENTMCNC: 35.2 G/DL (ref 28–37)
MCV RBC: 90.6 FL (ref 80–100)
MONOCYTES NFR BLD: 9 % (ref 1–8)
NEUTROPHILS # BLD: 3.7 THOU/UL (ref 1.4–8.2)
PLATELET # BLD: 412 THOU/UL (ref 150–400)
POTASSIUM SERPL-SCNC: 4.4 MMOL/L (ref 3.5–5.1)
PROTHROMBIN TIME: 10.3 SECONDS (ref 10.5–12.1)
RBC # BLD AUTO: 4.8 MIL/UL (ref 4.5–6)
SODIUM SERPL-SCNC: 132 MMOL/L (ref 136–145)
WBC # BLD AUTO: 7.2 THOU/UL (ref 4–11)

## 2021-09-25 PROCEDURE — 10879: CPT

## 2021-09-25 NOTE — HC
[facility]
[address]   [address]                     CONSULTATION                  
_______________________________________________________________________________
 
Name:       DOMINICK VALDEZ JR              Room #:         360-P       [facility] [place]  
..#:      [id number]                       Account #:      [id number]  
Admission:  09/25/21    Attend Phys:    Diego Browne MD     
Discharge:              Date of Birth:  [birth year]  
                                                          Report #: [id number]
                                                                    [id number] 
_______________________________________________________________________________
THIS REPORT FOR:  
 
cc:  Niraj Martin MD, Shyam MD Smithson, David G. MD                                         ~
 
DATE OF SERVICE: 09/29/2021
 
HISTORY OF PRESENT ILLNESS:  The patient is a 61-year-old male with a right 
cerebellar CVA and vertebral artery dissection.  He was originally admitted on 
09/25/2021 with complaints of dizziness.  He was not felt to be a candidate for 
TPA.  The MRI scanner was down, but he is going to be getting the MRI today.  He
feels that the dizziness is improving and that he is pretty close to his 
baseline.  We are seeing him in rehabilitation medicine consultation.  His past 
medical history includes a prior left CVA with right hemiparesis with which he 
had some baseline dysarthria and very mild right-sided weakness.  This was a 
past left subcortical basal ganglia CVA.  He also has a history of chronic 
kidney disease, diabetes mellitus type 2, hyperlipidemia.
 
HABITS:  Include tobacco 3-4 cigarettes per day and alcohol use 3 times a week.
 
SOCIAL HISTORY:  Lives with his son in an apartment.  Son works during the day. 
The patient was premorbidly ambulatory without gait aids.  There are 4 steps in.
 
REVIEW OF SYSTEMS:  Notes that the dizziness is getting better.  No complaints 
of chest pain, shortness of breath or abdominal discomfort.
 
PHYSICAL EXAMINATION:
GENERAL:  A 61-year-old -American male, in no obvious distress.
VITAL SIGNS:  Last recorded temperature 36.8, pulse 83, respirations 18, blood 
pressure 143/94.
NEUROLOGIC:  He is alert, follows basic 1-step commands.  Appears appropriate.  
EOMs are full.  No nystagmus.  He does have some mild dysarthria, which was 
noted to be premorbid.
EXTREMITIES:  He has functional range of motion of both upper extremities and 
strength appears to be at least a grade 4/5.  He does well as far as fine finger
dexterity and was able to do well with finger-to-nose as well as heel-to-shin.  
Tone appeared to be intact.  Functionally, he was standby assistance, sit to 
stand, was able to ambulate min assist for 45 feet without an assistive device. 
Tinetti score for balance was quite good at 26/28.  He was seen by OT with lower
extremity dressing supervision.
 
ASSESSMENT:  A 61-year-old -American male with the following problem 
list:
1.  Right cerebellar cerebrovascular accident.  Noted vertebral artery 
dissection.  To have MRI scan today.
 
 
 
[place] [address]                     CONSULTATION                  
_______________________________________________________________________________
 
Name:       JOSÉ MIGUELDOMINICK               Room #:         360-P       [facility] [place]  
M.R.#:      [id number]                       Account #:      [id number]  
Admission:  09/25/21    Attend Phys:    Diego Browne MD     
Discharge:              Date of Birth:  [birth year]  
                                                          Report #: [id number]
                                                                    [id number] 
_______________________________________________________________________________
 
2.  Prior left subcortical basal ganglia cerebrovascular accident with some 
baseline mild dysarthria and right right-sided weakness.
3.  Chronic kidney disease.
4.  Diabetes mellitus type 2.
5.  Hypertension.
 
PLAN:  The patient feels the dizziness is improving.  Functionally, he appears 
too high level to warrant an acute inpatient [facility] rehabilitation stay.  Would
anticipate he should be able to return back to the home setting with likely some
home health care versus outpatient as he further medically stabilizes.
 
Thank you for asking us to assist in this patient's care.
 
 
 
 
 
 
 
 
 
 
 
 
 
 
 
 
 
 
 
 
 
 
 
 
 
 
 
 
 
 
 
                         
   By:                               
                   
D: 09/29/21 0917                           _____________________________________
T: 09/29/21 1317                           Lj Johnson MD           /nt

## 2021-09-26 VITALS — DIASTOLIC BLOOD PRESSURE: 86 MMHG | SYSTOLIC BLOOD PRESSURE: 135 MMHG

## 2021-09-26 LAB
ANION GAP SERPL CALC-SCNC: 11 MMOL/L (ref 7–16)
BUN SERPL-MCNC: 24 MG/DL (ref 7–18)
CALCIUM SERPL-MCNC: 8.9 MG/DL (ref 8.5–10.1)
CHLORIDE SERPL-SCNC: 101 MMOL/L (ref 98–107)
CHOLEST SERPL-MCNC: 293 MG/DL (ref ?–200)
CO2 SERPL-SCNC: 22 MMOL/L (ref 21–32)
CREAT SERPL-MCNC: 1.8 MG/DL (ref 0.7–1.3)
GLUCOSE SERPL-MCNC: 249 MG/DL (ref 74–106)
HDLC SERPL-MCNC: 39 MG/DL (ref 40–?)
POTASSIUM SERPL-SCNC: 4.6 MMOL/L (ref 3.5–5.1)
SODIUM SERPL-SCNC: 134 MMOL/L (ref 136–145)
TC:HDL: 7.5 RATIO
TRIGL SERPL-MCNC: 535 MG/DL (ref ?–150)
VLDLC SERPL CALC-MCNC: 107 MG/DL (ref ?–40)

## 2021-09-26 NOTE — EKG
[address] [employer]
[place]  [address]
Phone:  [phone]                    ELECTROCARDIOGRAM REPORT      
_______________________________________________________________________________
 
Name:       DOMINICK VALDEZ JR              Room #:         170-8       [facility] [place]  
M.R.#:      [id number]     Account #:      [id number]  
Admission:  21    Attend Phys:    Diego Browne MD     
Discharge:              Date of Birth:  [birth year]  
                                                          Report #: [id number]
   [id number]
_______________________________________________________________________________
                         [facility] ED
                                       
Test Date:    2021               Test Time:    19:49:42
Pat Name:     DOMINICK VALDEZ            Department:   
Patient ID:   [id number]            Room:         170
Gender:       M                        Technician:   carlene
:          1960               Requested By: Nafisa Wood
Order Number: [id number] MD:   Sergio Vallejo
                                 Measurements
Intervals                              Axis          
Rate:         84                       P:            45
DE:           153                      QRS:          5
QRSD:         89                       T:            
QT:           364                                    
QTc:          431                                    
                           Interpretive Statements
Sinus rhythm
Left ventricular hypertrophy
Nonspecific T abnormalities, lateral leads
Baseline wander in lead(s) V6
Compared to ECG 2018 23:00:20
Left ventricular hypertrophy now present
T-wave abnormality now present
Left bundle-branch block no longer present
Electronically Signed On 2021 11:32:26 CDT by Sergio Vallejo
[email or web address]
 
 
 
 
 
 
 
 
 
 
 
 
 
 
 
 
 
  <ELECTRONICALLY SIGNED>
   By: Sergio Vallejo MD, FAC    
  21     1132
D: 21                           _____________________________________
T: 21                           Sergio Vallejo MD, [facility]      /EPI

## 2021-09-27 VITALS — SYSTOLIC BLOOD PRESSURE: 155 MMHG | DIASTOLIC BLOOD PRESSURE: 100 MMHG

## 2021-09-27 VITALS — SYSTOLIC BLOOD PRESSURE: 165 MMHG | DIASTOLIC BLOOD PRESSURE: 110 MMHG

## 2021-09-27 VITALS — SYSTOLIC BLOOD PRESSURE: 132 MMHG | DIASTOLIC BLOOD PRESSURE: 96 MMHG

## 2021-09-27 LAB
EST. AVERAGE GLUCOSE BLD GHB EST-MCNC: 292 MG/DL
GLYCOHEMOGLOBIN (HGB A1C): 11.8 % (ref 4.8–5.6)

## 2021-09-27 NOTE — 2DMMODE
[facility]
[address] [place]   [address]                   2 D/M-MODE ECHOCARDIOGRAM     
_______________________________________________________________________________
 
Name:       DOMINICK VALDEZ               Room #:         170-8       ADM [place]  
M.R.#:      [id number]                       Account #:      [id number]  
Admission:  21    Attend Phys:    Diego Browne MD     
Discharge:              Date of Birth:  [birth year]  
                                                          Report #: [id number]
                                                                    [id number]
_______________________________________________________________________________
THIS REPORT FOR:  
 
cc:  Niraj Martin MD, Shyam MD Lundgren,Ralf MORA MD [facility]                                        
                                                                       ~
 
--------------- APPROVED REPORT --------------
 
 
Study performed:  2021 11:27:49
 
EXAM: Comprehensive 2D, Doppler, and color-flow 
Echocardiogram 
Patient Location: ER    
Room #:  8     Status:  routine
 
      BSA:         2.17
HR: 75 bpm BP:          162/95 mmHg 
Rhythm: NSR     
 
Other Information 
Study Quality: Good
 
Indications
CVA/TIA 
Diabetes
Hypertension/HDD
 
2D Dimensions
RVDd:  36.35 mm   
IVSd:  13.22 (7-11mm)  LVOT Diam:  21.16 (18-24mm) 
LVDd:  47.73 mm   
PWd:  13.45 (7-11mm)  Ascending Ao:  27.54 (22-36mm)
LVDs:  32.78 (25-40mm)  
Left Atrium:  37.35 (27-40mm) 
Aortic Root:  31.34 mm  IVC:  15.00 mm
 
Volumes
Left Atrial Volume (Systole) 
Single Plane 4CH:  30.47 mL Single Plane 2CH:  39.10 mL
    LA ESV Index:  18.00 mL/m2
 
Aortic Valve
AoV Peak Titus.:  1.44 m/s 
AO Peak Gr.:  8.29 mmHg  LVOT Max P.55 mmHg
 
 
[facility]
[address]
[place]  [address]
Phone:  [phone]                    2 D/M-MODE ECHOCARDIOGRAM     
_______________________________________________________________________________
 
Name:            DOMINICK VALDEZ               Room #:        170-8       ADM [place]
[facility].#:           [id number]          Account #:     [id number]  
Admission:       21         Attend Phys:   Diego Browne MD 
Discharge:                  Date of Birth: [birth year]  
                         Report #:      [id number]
        [id number]
_______________________________________________________________________________
    LVOT Max V:  0.80 m/s
BROOKE Vmax: 1.95 cm2  
 
Mitral Valve
    E/A Ratio:  0.6
    MV Decel. Time:  233.48 ms
MV E Max Titus.:  0.44 m/s 
MV A Titus.:  0.69 m/s  
MV PHT:  67.71 ms  
IVRT:  110.73 ms  
 
Pulmonary Valve
PV Peak Titus.:  1.09 m/s PV Peak Gr.:  4.71 mmHg
 
Pulmonary Vein
P Vein S:    0.42 m/s P Vein A:  0.21 m/s
P Vein D:   0.27 m/s P Vein A Dur.:  101.5 msec
P Vein S/D Ratio:  1.56 
 
Left Ventricle
The left ventricle is normal size. There is normal LV segmental wall 
motion. There is normal left ventricular wall thickness. Left 
ventricular systolic function is normal. The left ventricular 
ejection fraction is within the normal range. LVEF 60%. Mild 
diastolic dysfunction 
 
Right Ventricle
The right ventricle is normal size. The right ventricular systolic 
function is normal.
 
Atria
The left atrium size is normal. No shunting by contrast bubble 
injection The right atrium size is normal.
 
Aortic Valve
The aortic valve is normal in structure. No aortic regurgitation is 
present. There is no aortic valvular stenosis.
 
Mitral Valve
The mitral valve is normal in structure. There is no mitral valve 
regurgitation noted. No evidence of mitral valve stenosis.
 
Tricuspid Valve
The tricuspid valve is normal in structure. There is no tricuspid 
valve regurgitation noted.
 
 
 
[facility]
[address]
[place]  [address]
Phone:  [phone]                    2 D/M-MODE ECHOCARDIOGRAM     
_______________________________________________________________________________
 
Name:            DOMINICK VALDEZ               Room #:        170-8       ADM IN
M.R.#:           [id number]          Account #:     [id number]  
Admission:       21         Attend Phys:   Diego Browne MD 
Discharge:                  Date of Birth: [birth year]  
                         Report #:      [id number]
        [id number]
_______________________________________________________________________________
Pulmonic Valve
The pulmonary valve is normal in structure. There is no pulmonic 
valvular regurgitation.
 
Great Vessels
The aortic root is normal in size. IVC is normal in size and 
collapses >50% with inspiration.
 
Pericardium
There is no pericardial effusion.
 
<Conclusion>
Left ventricular systolic function is normal.
There is normal LV segmental wall motion.
LVEF 60%.
Mild diastolic dysfunction
No shunting by contrast bubble injection
The aortic valve is normal in structure. No aortic regurgitation or 
stenosis.
The mitral valve is normal in structure. No mitral valve 
regurgitation
There is no pericardial effusion.
 
 
 
 
 
 
 
 
 
 
 
 
 
 
 
 
 
 
 
 
 
 
  <ELECTRONICALLY SIGNED>
   By: Ralf Allison MD, [facility]   
  21     1223
D: 21 1223                           _____________________________________
T: 21 1223                           Ralf Allison MD, FAC     /INF

## 2021-09-27 NOTE — NUR
PT ADMITTED AROUND CHANGE OF SHIFT. PT CHEERFUL HAPPY. DENIES ANY DIZZINESS OR
WEAKNESS. DENIES BEING A FALL RISK AND DOES NOT WANT TO WEAR YELLOW SOCKS. PT
DID AGREE TO NOTIFY NURSE WHEN TRANSFERING AND TO PUT ON SHOES. PT NOTED TO
HAVE STEADY GAIT, NO DRIFT WITH NIH SCALE. PT STATES HE LIVES WITH HIS SON AND
PLANS ON DC IN AM. MED HX DM, HTN, LBBB, CIGARETTE SMOKER-DENIES WANTING
NICOTENE PATCH, ETOH USE WEEKLY SOCIAL, PREVIOUS CVA NO RESIDUAL. R
THORACOTOMY/DECORTICATION. PT HAD DINNER.

## 2021-09-28 VITALS — SYSTOLIC BLOOD PRESSURE: 142 MMHG | DIASTOLIC BLOOD PRESSURE: 95 MMHG

## 2021-09-28 VITALS — DIASTOLIC BLOOD PRESSURE: 104 MMHG | SYSTOLIC BLOOD PRESSURE: 163 MMHG

## 2021-09-28 VITALS — DIASTOLIC BLOOD PRESSURE: 114 MMHG | SYSTOLIC BLOOD PRESSURE: 182 MMHG

## 2021-09-28 VITALS — SYSTOLIC BLOOD PRESSURE: 139 MMHG | DIASTOLIC BLOOD PRESSURE: 97 MMHG

## 2021-09-28 VITALS — SYSTOLIC BLOOD PRESSURE: 164 MMHG | DIASTOLIC BLOOD PRESSURE: 100 MMHG

## 2021-09-28 VITALS — SYSTOLIC BLOOD PRESSURE: 124 MMHG | DIASTOLIC BLOOD PRESSURE: 83 MMHG

## 2021-09-28 LAB
ANION GAP SERPL CALC-SCNC: 14 MMOL/L (ref 7–16)
BUN SERPL-MCNC: 16 MG/DL (ref 7–18)
CALCIUM SERPL-MCNC: 9.1 MG/DL (ref 8.5–10.1)
CHLORIDE SERPL-SCNC: 98 MMOL/L (ref 98–107)
CO2 SERPL-SCNC: 20 MMOL/L (ref 21–32)
CREAT SERPL-MCNC: 1.5 MG/DL (ref 0.7–1.3)
GLUCOSE SERPL-MCNC: 249 MG/DL (ref 74–106)
POTASSIUM SERPL-SCNC: 4 MMOL/L (ref 3.5–5.1)
SODIUM SERPL-SCNC: 132 MMOL/L (ref 136–145)

## 2021-09-28 NOTE — NUR
INITIAL ASSESSMENT:
Received consult. SW reviewed chart and spoke with nursing and attending
physician. Pt was admitted from home due to CVA. MRI is pending. Pt had
negative COVID test and has not received a COVID vaccine. SW spoke with pt via
phone. Introduced role of SW. Pt is alert/orientated and states that he lives
at home. Pt's son lives with him. Prior to admission, pt was independent with
ADLs. No use of DME. There are 4 steps to enter pt's home. No steps inside. Pt
has been to [facility] in the past following CVA in 2018. No hx of  services. Pt's
PCP is Dr. Jose Joel. PT/OT have evaluated pt. Pt's goal is to return
home when medically stable. SW is following to assist as needed with discharge
planning.

## 2021-09-29 VITALS — SYSTOLIC BLOOD PRESSURE: 168 MMHG | DIASTOLIC BLOOD PRESSURE: 97 MMHG

## 2021-09-29 VITALS — DIASTOLIC BLOOD PRESSURE: 93 MMHG | SYSTOLIC BLOOD PRESSURE: 155 MMHG

## 2021-09-29 VITALS — DIASTOLIC BLOOD PRESSURE: 105 MMHG | SYSTOLIC BLOOD PRESSURE: 161 MMHG

## 2021-09-29 VITALS — DIASTOLIC BLOOD PRESSURE: 94 MMHG | SYSTOLIC BLOOD PRESSURE: 143 MMHG

## 2021-09-29 VITALS — DIASTOLIC BLOOD PRESSURE: 80 MMHG | SYSTOLIC BLOOD PRESSURE: 152 MMHG

## 2021-09-29 VITALS — SYSTOLIC BLOOD PRESSURE: 169 MMHG | DIASTOLIC BLOOD PRESSURE: 94 MMHG

## 2021-09-29 VITALS — SYSTOLIC BLOOD PRESSURE: 146 MMHG | DIASTOLIC BLOOD PRESSURE: 89 MMHG

## 2021-09-29 VITALS — SYSTOLIC BLOOD PRESSURE: 141 MMHG | DIASTOLIC BLOOD PRESSURE: 91 MMHG

## 2021-09-29 VITALS — DIASTOLIC BLOOD PRESSURE: 91 MMHG | SYSTOLIC BLOOD PRESSURE: 114 MMHG

## 2021-09-29 VITALS — SYSTOLIC BLOOD PRESSURE: 107 MMHG | DIASTOLIC BLOOD PRESSURE: 66 MMHG

## 2021-09-29 NOTE — NUR
RN ASSUMED PT'S CARE AT 0700AM, PT IS A&OX4, PT'S VS ARE STABLE, PT STILL HAS
R ARM WEAKNESS, BUT PT DENIES SOB AND PAIN AT DAY SHIFT.

## 2021-09-29 NOTE — NUR
BRITTANI reviewed chart and spoke with nursing and attending physician. Pt had MRI
earlier today. [facility] evaluated pt and is too high level for admission to . SW
met with pt bedside to discuss discharge plans. Pt states he would like to
visit with the physicians before making a decision about HH services. BRITTANI is
following to assist as needed with discharge planning.

## 2021-09-29 NOTE — NUR
NOTED AM BLOOD PRESSURE 169/94 WITH HR 78 BPM.  PT C/O 5/10 "SHARP" HEADACHE.
PT DID
SIT UP IN BED AND COMPLAIN OF SUDDEN DIZZINESS.  DENIED ANY SENSE OF THE ROOM
SPINNING BUT DID STATE HE FELT LIKE HE WAS GOING TO TIP OVER.  PT PT DID LAY
BACK DOWN.  DR. KNAPP'S NOTE VIEWED.  PT GIVEN HYDRALAZINE 10MG IV AND 650MG
ORAL TYLENOL.  BRIEF NIH PERFORMED AT THAT TIME.  DID NOT DETECT ANY CHANGE
FROM PREVIOUS SCORES.

## 2021-09-30 VITALS — DIASTOLIC BLOOD PRESSURE: 52 MMHG | SYSTOLIC BLOOD PRESSURE: 83 MMHG

## 2021-09-30 VITALS — DIASTOLIC BLOOD PRESSURE: 66 MMHG | SYSTOLIC BLOOD PRESSURE: 104 MMHG

## 2021-09-30 VITALS — SYSTOLIC BLOOD PRESSURE: 105 MMHG | DIASTOLIC BLOOD PRESSURE: 70 MMHG

## 2021-09-30 VITALS — SYSTOLIC BLOOD PRESSURE: 116 MMHG | DIASTOLIC BLOOD PRESSURE: 93 MMHG

## 2021-09-30 VITALS — SYSTOLIC BLOOD PRESSURE: 121 MMHG | DIASTOLIC BLOOD PRESSURE: 79 MMHG

## 2021-09-30 VITALS — DIASTOLIC BLOOD PRESSURE: 93 MMHG | SYSTOLIC BLOOD PRESSURE: 139 MMHG

## 2021-09-30 VITALS — SYSTOLIC BLOOD PRESSURE: 97 MMHG | DIASTOLIC BLOOD PRESSURE: 65 MMHG

## 2021-09-30 VITALS — SYSTOLIC BLOOD PRESSURE: 125 MMHG | DIASTOLIC BLOOD PRESSURE: 73 MMHG

## 2021-09-30 LAB
ANION GAP SERPL CALC-SCNC: 10 MMOL/L (ref 7–16)
BUN SERPL-MCNC: 30 MG/DL (ref 7–18)
CALCIUM SERPL-MCNC: 8.6 MG/DL (ref 8.5–10.1)
CHLORIDE SERPL-SCNC: 98 MMOL/L (ref 98–107)
CO2 SERPL-SCNC: 22 MMOL/L (ref 21–32)
CREAT SERPL-MCNC: 1.8 MG/DL (ref 0.7–1.3)
GLUCOSE SERPL-MCNC: 234 MG/DL (ref 74–106)
POTASSIUM SERPL-SCNC: 3.8 MMOL/L (ref 3.5–5.1)
SODIUM SERPL-SCNC: 130 MMOL/L (ref 136–145)

## 2021-09-30 NOTE — NUR
RN ASSUMED PT'S CARE AT 0700AM, PT IS A&OX4, PT IS ON ROOM AIR, PT DENIES SOB
AND N/V AT DAY SHIFT, RN HAS CALLED DR TO REPORT PT'S HEADACHE, NEW ORDER HEAD
CT SCAN , RESULTS SHOW UNCHANGED EVOLVING RIGT CEREBELLAR INFARACT.PT STILL
HAS SLIGHTLY SLURRED SPEACH , BUT NO NEW S/S OF CVA, PT STARTS IV FLUID PER
ORDER, WE WILL KEEP EYE ON PT.

## 2021-09-30 NOTE — NUR
PT RESTING QUIETLY. C/O MILD HA THIS AM. NOTIFIED NP. FIORICET TO BE GIVEN
WHEN AVAILABLE FROM PHARMACY . PT HAS NO OTHER C/O THIS AM . SPECCH IS STILL
SLIGHTLY SLURRED AS BEGINNING OF SHIFT. PT STATED HIS SPEECH HAS BEEN SLURRED
FROM HIS LAST CVA. OTHER PARTS OF NIH ARE NORMAL. PT ABLE TO AMBULATE TO [facility]
WITH STEADY GAIT. DENIED DIZZINESS OR NAUSEA.

## 2021-09-30 NOTE — NUR
PT C/O HA 3/10 THIS AMD AT 0532. MEDICATED WITH 1 FIORICET. PAIN RATED 4/10
STILL AFTER PAIN MED. NOTIFIED DAY SHIFT NS IN REPORT. SHE IS CALLING DR ENRIQUEZ TO LET HIM KNOW OF CONTINUED HEADACHE. NEUROLOGICAL ASSESSMENT RENAINS
THE SAME.

## 2021-09-30 NOTE — NUR
SW reviewed chart and spoke with nursing and attending physician. Pt is
progressing towards goals for discharge. SW discussed case with [facility] rehab
liaison. Pt is too high level for inpt acute rehab. Pt is also not wanting to
go to rehab. Pt to be monitored by neuro due to new CVA. Plavix is on hold.
Anticipate pt will discharge home with  services when medically stable. BRITTANI
is following to assist as needed with discharge planning.

## 2021-10-01 VITALS — DIASTOLIC BLOOD PRESSURE: 74 MMHG | SYSTOLIC BLOOD PRESSURE: 120 MMHG

## 2021-10-01 VITALS — SYSTOLIC BLOOD PRESSURE: 124 MMHG | DIASTOLIC BLOOD PRESSURE: 72 MMHG

## 2021-10-01 VITALS — SYSTOLIC BLOOD PRESSURE: 121 MMHG | DIASTOLIC BLOOD PRESSURE: 76 MMHG

## 2021-10-01 VITALS — DIASTOLIC BLOOD PRESSURE: 87 MMHG | SYSTOLIC BLOOD PRESSURE: 138 MMHG

## 2021-10-01 VITALS — DIASTOLIC BLOOD PRESSURE: 79 MMHG | SYSTOLIC BLOOD PRESSURE: 152 MMHG

## 2021-10-01 LAB
ANION GAP SERPL CALC-SCNC: 9 MMOL/L (ref 7–16)
BUN SERPL-MCNC: 27 MG/DL (ref 7–18)
CALCIUM SERPL-MCNC: 8.7 MG/DL (ref 8.5–10.1)
CHLORIDE SERPL-SCNC: 101 MMOL/L (ref 98–107)
CO2 SERPL-SCNC: 24 MMOL/L (ref 21–32)
CREAT SERPL-MCNC: 1.5 MG/DL (ref 0.7–1.3)
ERYTHROCYTE [DISTWIDTH] IN BLOOD BY AUTOMATED COUNT: 13.5 % (ref 10.5–14.5)
GLUCOSE SERPL-MCNC: 171 MG/DL (ref 74–106)
HCT VFR BLD CALC: 40.7 % (ref 42–52)
HGB BLD-MCNC: 14 GM/DL (ref 14–18)
MCH RBC QN AUTO: 31.6 PG (ref 26–34)
MCHC RBC AUTO-ENTMCNC: 34.5 G/DL (ref 28–37)
MCV RBC: 91.6 FL (ref 80–100)
PLATELET # BLD: 405 THOU/UL (ref 150–400)
POTASSIUM SERPL-SCNC: 4.6 MMOL/L (ref 3.5–5.1)
RBC # BLD AUTO: 4.44 MIL/UL (ref 4.5–6)
SODIUM SERPL-SCNC: 134 MMOL/L (ref 136–145)
WBC # BLD AUTO: 8.3 THOU/UL (ref 4–11)

## 2021-10-01 NOTE — NUR
SW reviewed chart and spoke with nursing and attending physician. Pt is
progressing towards goals for discharge. Pt is progressing towards goals for
discharge. [facility] liaison met with pt at bedside to discuss
home health services. Pt declines needing HH when discharged. PT/OT working
with pt. Case discussed with [facility] rehab liaison. Pt is too high level for
admission to inpt acute rehab.  Plan is for pt to discharge home when
medically stable. SW is following to assist as needed with discharge planning.

## 2021-10-01 NOTE — NUR
PROGRESS
 
PT A/O X4. PT DENIES DIZZINESS EXCEPT IF HE GETS UP TOO FAST. STATES HEADACHE
HAS RESOLVED AND DENIED NEED FOR MEDICATION. UP AD RASHAWN VOIDING QS IVF'S
INFUSING AS ORDERED. SPEECH REMAINS SLIGHTLY SLURRED BUT FAMILY STATED THAT IT
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 UP AD RASHAWN
GAIT STEADY VOIDING QS AND TOLERATING PO FOOD AND FLUIDS. ACCUCHECKS AND SSI
CONTINUE. CONTINUE POC.

## 2021-10-01 NOTE — NUR
PT VASILIY S/W SLP REGARDING CONCERNS ABOUT PATIENT'S COGNITION.  SLP S/W RN
AND PATIENT.  PATIENT DEMONSTRATED THE ABILITY TO LIST HOME MEDICATIONS.
COMPLETED INFORMAL MEMORY ASSESSMENT, NO CHANGES IN PATIENT'S PERFORMANCE IN
COMPARISION TO EVAL.  PATIENT ORIENTED X4.  STATED HE WAS IN THE HOSPITAL
BECAUSE HE HAD A STROKE.  SLP SUSPECTS PATIENT REMAINS AT BASELINE
FUNCTIONING.  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
EDUCATION.

## 2021-10-01 NOTE — NUR
REHAB LIAISON MET WITH PATIENT ON 9/30/21 AND PATIENT DECLINING ACUTE REHAB
STAY. SPOKE WITH D/C PLANNER THIS DATE AND CONFIRMED THAT THERE WAS NO NEED TO
REQUEST AUTHORIZATION. KAMLA D/C PLANNER, IN AGREEMENT.

## 2021-10-02 VITALS — SYSTOLIC BLOOD PRESSURE: 149 MMHG | DIASTOLIC BLOOD PRESSURE: 95 MMHG

## 2021-10-02 VITALS — DIASTOLIC BLOOD PRESSURE: 97 MMHG | SYSTOLIC BLOOD PRESSURE: 150 MMHG

## 2021-10-02 VITALS — SYSTOLIC BLOOD PRESSURE: 157 MMHG | DIASTOLIC BLOOD PRESSURE: 98 MMHG

## 2021-10-02 VITALS — DIASTOLIC BLOOD PRESSURE: 90 MMHG | SYSTOLIC BLOOD PRESSURE: 153 MMHG

## 2021-10-02 VITALS — SYSTOLIC BLOOD PRESSURE: 129 MMHG | DIASTOLIC BLOOD PRESSURE: 79 MMHG

## 2021-10-03 VITALS — DIASTOLIC BLOOD PRESSURE: 117 MMHG | SYSTOLIC BLOOD PRESSURE: 169 MMHG

## 2021-10-03 VITALS — SYSTOLIC BLOOD PRESSURE: 143 MMHG | DIASTOLIC BLOOD PRESSURE: 98 MMHG

## 2021-10-03 VITALS — SYSTOLIC BLOOD PRESSURE: 169 MMHG | DIASTOLIC BLOOD PRESSURE: 117 MMHG

## 2021-10-03 VITALS — SYSTOLIC BLOOD PRESSURE: 151 MMHG | DIASTOLIC BLOOD PRESSURE: 95 MMHG

## 2021-10-03 VITALS — DIASTOLIC BLOOD PRESSURE: 88 MMHG | SYSTOLIC BLOOD PRESSURE: 125 MMHG
